# Patient Record
Sex: MALE | Race: WHITE | NOT HISPANIC OR LATINO | Employment: FULL TIME | URBAN - METROPOLITAN AREA
[De-identification: names, ages, dates, MRNs, and addresses within clinical notes are randomized per-mention and may not be internally consistent; named-entity substitution may affect disease eponyms.]

---

## 2018-05-25 ENCOUNTER — OFFICE VISIT (OUTPATIENT)
Dept: FAMILY MEDICINE | Facility: CLINIC | Age: 33
End: 2018-05-25
Payer: COMMERCIAL

## 2018-05-25 VITALS
HEIGHT: 67 IN | RESPIRATION RATE: 14 BRPM | HEART RATE: 66 BPM | BODY MASS INDEX: 21.88 KG/M2 | DIASTOLIC BLOOD PRESSURE: 74 MMHG | WEIGHT: 139.4 LBS | SYSTOLIC BLOOD PRESSURE: 116 MMHG | TEMPERATURE: 97.2 F | OXYGEN SATURATION: 98 %

## 2018-05-25 DIAGNOSIS — Z00.00 ROUTINE GENERAL MEDICAL EXAMINATION AT A HEALTH CARE FACILITY: Primary | ICD-10-CM

## 2018-05-25 DIAGNOSIS — Z23 NEED FOR PROPHYLACTIC VACCINATION WITH TETANUS-DIPHTHERIA (TD): ICD-10-CM

## 2018-05-25 DIAGNOSIS — R53.83 FATIGUE, UNSPECIFIED TYPE: ICD-10-CM

## 2018-05-25 DIAGNOSIS — R09.82 POST-NASAL DRIP: ICD-10-CM

## 2018-05-25 DIAGNOSIS — J31.2 SORE THROAT, CHRONIC: ICD-10-CM

## 2018-05-25 LAB
ALBUMIN SERPL-MCNC: 4.3 G/DL (ref 3.4–5)
ALP SERPL-CCNC: 63 U/L (ref 40–150)
ALT SERPL W P-5'-P-CCNC: 20 U/L (ref 0–70)
ANION GAP SERPL CALCULATED.3IONS-SCNC: 6 MMOL/L (ref 3–14)
AST SERPL W P-5'-P-CCNC: 22 U/L (ref 0–45)
BILIRUB SERPL-MCNC: 0.7 MG/DL (ref 0.2–1.3)
BUN SERPL-MCNC: 18 MG/DL (ref 7–30)
CALCIUM SERPL-MCNC: 9.5 MG/DL (ref 8.5–10.1)
CHLORIDE SERPL-SCNC: 106 MMOL/L (ref 94–109)
CHOLEST SERPL-MCNC: 89 MG/DL
CO2 SERPL-SCNC: 28 MMOL/L (ref 20–32)
CREAT SERPL-MCNC: 0.98 MG/DL (ref 0.66–1.25)
GFR SERPL CREATININE-BSD FRML MDRD: 88 ML/MIN/1.7M2
GLUCOSE SERPL-MCNC: 89 MG/DL (ref 70–99)
HDLC SERPL-MCNC: 51 MG/DL
HETEROPH AB SER QL: NEGATIVE
LDLC SERPL CALC-MCNC: 28 MG/DL
NONHDLC SERPL-MCNC: 38 MG/DL
POTASSIUM SERPL-SCNC: 4.4 MMOL/L (ref 3.4–5.3)
PROT SERPL-MCNC: 8.1 G/DL (ref 6.8–8.8)
SODIUM SERPL-SCNC: 140 MMOL/L (ref 133–144)
TRIGL SERPL-MCNC: 50 MG/DL

## 2018-05-25 PROCEDURE — 80053 COMPREHEN METABOLIC PANEL: CPT | Performed by: FAMILY MEDICINE

## 2018-05-25 PROCEDURE — 99395 PREV VISIT EST AGE 18-39: CPT | Mod: 25 | Performed by: FAMILY MEDICINE

## 2018-05-25 PROCEDURE — 36415 COLL VENOUS BLD VENIPUNCTURE: CPT | Performed by: FAMILY MEDICINE

## 2018-05-25 PROCEDURE — 90471 IMMUNIZATION ADMIN: CPT | Performed by: FAMILY MEDICINE

## 2018-05-25 PROCEDURE — 99213 OFFICE O/P EST LOW 20 MIN: CPT | Mod: 25 | Performed by: FAMILY MEDICINE

## 2018-05-25 PROCEDURE — 80061 LIPID PANEL: CPT | Performed by: FAMILY MEDICINE

## 2018-05-25 PROCEDURE — 90715 TDAP VACCINE 7 YRS/> IM: CPT | Performed by: FAMILY MEDICINE

## 2018-05-25 PROCEDURE — 86308 HETEROPHILE ANTIBODY SCREEN: CPT | Performed by: FAMILY MEDICINE

## 2018-05-25 RX ORDER — AZELASTINE 1 MG/ML
1 SPRAY, METERED NASAL 2 TIMES DAILY
Qty: 1 BOTTLE | Refills: 1 | Status: SHIPPED | OUTPATIENT
Start: 2018-05-25 | End: 2019-02-04

## 2018-05-25 ASSESSMENT — ENCOUNTER SYMPTOMS
DIARRHEA: 0
DIZZINESS: 0
SINUS PAIN: 0
LIGHT-HEADEDNESS: 0
DECREASED CONCENTRATION: 0
TROUBLE SWALLOWING: 0
APPETITE CHANGE: 0
HEMATOCHEZIA: 0
DYSPHORIC MOOD: 0
UNEXPECTED WEIGHT CHANGE: 0
HEARTBURN: 0
EYE REDNESS: 0
ARTHRALGIAS: 0
PALPITATIONS: 0
SHORTNESS OF BREATH: 0
ACTIVITY CHANGE: 0
ADENOPATHY: 0
COUGH: 0
SLEEP DISTURBANCE: 0
FATIGUE: 0
SINUS PRESSURE: 0
DIAPHORESIS: 0
DIFFICULTY URINATING: 0
SORE THROAT: 1
ABDOMINAL PAIN: 0
RHINORRHEA: 0
HEADACHES: 0
NERVOUS/ANXIOUS: 0
CONSTIPATION: 0

## 2018-05-25 NOTE — PATIENT INSTRUCTIONS
[facility]    If you have any questions regarding to your visit please contact your care team:       Team Purple:   Clinic Hours Telephone Number   Dr. Maxine Osuna   7am-7pm  Monday - Thursday   7am-5pm  Fridays  [phone]  (Appointment scheduling available 24/7)    Questions about your recent visit?   Team Line:  [phone]   Urgent Care - [place] and [facility] - 11am-9pm Monday-Friday Saturday-Sunday- 9am-5pm   [place] - 5pm-9pm Monday-Friday Saturday-Sunday- 9am-5pm  [phone] - [place]  [phone] - [place]       What options do I have for a visit other than an office visit? We offer electronic visits (e-visits) and telephone visits, when medically appropriate.  Please check with your medical insurance to see if these types of visits are covered, as you will be responsible for any charges that are not paid by your insurance.      You can use [employer] (secure electronic communication) to access to your chart, send your primary care provider a message, or make an appointment. Ask a team member how to get started.     For a price quote for your services, please call our Consumer Price Line at [phone] or our Imaging Cost estimation line at [phone] (for imaging tests).        Preventive Health Recommendations  Male Ages 26 - 39    Yearly exam:             See your health care provider every year in order to  o   Review health changes.   o   Discuss preventive care.    o   Review your medicines if your doctor has prescribed any.    You should be tested each year for STDs (sexually transmitted diseases), if you re at risk.     After age 35, talk to your provider about cholesterol testing. If you are at risk for heart disease, have your cholesterol tested at least every 5 years.     If you are at risk for diabetes, you should have a diabetes test (fasting glucose).  Shots: Get a flu shot each year. Get a tetanus  shot every 10 years.     Nutrition:    Eat at least 5 servings of fruits and vegetables daily.     Eat whole-grain bread, whole-wheat pasta and brown rice instead of white grains and rice.     Talk to your provider about Calcium and Vitamin D.     Lifestyle    Exercise for at least 150 minutes a week (30 minutes a day, 5 days a week). This will help you control your weight and prevent disease.     Limit alcohol to one drink per day.     No smoking.     Wear sunscreen to prevent skin cancer.     See your dentist every six months for an exam and cleaning.

## 2018-05-25 NOTE — MR AVS SNAPSHOT
After Visit Summary   5/25/2018    Linus Durand    MRN: [id number]           Patient Information     Date Of Birth          1985        Visit Information        Provider Department      5/25/2018 9:20 AM Brooks Osuna MD [facility]        Today's Diagnoses     Need for prophylactic vaccination with tetanus-diphtheria (TD)    -  1    Post-nasal drip        Sore throat, chronic        Fatigue, unspecified type        Routine general medical examination at a health care facility          Care Instructions    [facility]    If you have any questions regarding to your visit please contact your care team:       Team Purple:   Clinic Hours Telephone Number   Dr. Maxine Osuna   7am-7pm  Monday - Thursday   7am-5pm  Fridays  [phone]  (Appointment scheduling available 24/7)    Questions about your recent visit?   Team Line:  [phone]   Urgent Care - [place] and [facility] - 11am-9pm Monday-Friday Saturday-Sunday- 9am-5pm   [place] - 5pm-9pm Monday-Friday Saturday-Sunday- 9am-5pm  [phone] - [place]  [phone] - [place]       What options do I have for a visit other than an office visit? We offer electronic visits (e-visits) and telephone visits, when medically appropriate.  Please check with your medical insurance to see if these types of visits are covered, as you will be responsible for any charges that are not paid by your insurance.      You can use [employer] (secure electronic communication) to access to your chart, send your primary care provider a message, or make an appointment. Ask a team member how to get started.     For a price quote for your services, please call our Consumer Price Line at [phone] or our Imaging Cost estimation line at [phone] (for imaging tests).        Preventive Health Recommendations  Male Ages 26 - 39    Yearly exam:              See your health care provider every year in order to  o   Review health changes.   o   Discuss preventive care.    o   Review your medicines if your doctor has prescribed any.    You should be tested each year for STDs (sexually transmitted diseases), if you re at risk.     After age 35, talk to your provider about cholesterol testing. If you are at risk for heart disease, have your cholesterol tested at least every 5 years.     If you are at risk for diabetes, you should have a diabetes test (fasting glucose).  Shots: Get a flu shot each year. Get a tetanus shot every 10 years.     Nutrition:    Eat at least 5 servings of fruits and vegetables daily.     Eat whole-grain bread, whole-wheat pasta and brown rice instead of white grains and rice.     Talk to your provider about Calcium and Vitamin D.     Lifestyle    Exercise for at least 150 minutes a week (30 minutes a day, 5 days a week). This will help you control your weight and prevent disease.     Limit alcohol to one drink per day.     No smoking.     Wear sunscreen to prevent skin cancer.     See your dentist every six months for an exam and cleaning.             Follow-ups after your visit        Follow-up notes from your care team     Return if symptoms worsen or fail to improve.      Who to contact     If you have questions or need follow up information about today's clinic visit or your schedule please contact [facility] directly at [phone].  Normal or non-critical lab and imaging results will be communicated to you by MyChart, letter or phone within 4 business days after the clinic has received the results. If you do not hear from us within 7 days, please contact the clinic through MyChart or phone. If you have a critical or abnormal lab result, we will notify you by phone as soon as possible.  Submit refill requests through [employer] or call your pharmacy and they will forward the refill request to us. Please allow 3 business days for  "your refill to be completed.          Additional Information About Your Visit        [employer] Information     [employer] lets you send messages to your doctor, view your test results, renew your prescriptions, schedule appointments and more. To sign up, go to [email or web address] . Click on \"Log in\" on the left side of the screen, which will take you to the Welcome page. Then click on \"Sign up Now\" on the right side of the page.     You will be asked to enter the access code listed below, as well as some personal information. Please follow the directions to create your username and password.     Your access code is: [email or web address]  Expires: 2018  9:13 AM     Your access code will  in 90 days. If you need help or a new code, please call your [place] clinic or [phone].        Care EveryWhere ID     This is your Care EveryWhere ID. This could be used by other organizations to access your [place] medical records  [id number]        Your Vitals Were     Pulse Temperature Respirations Height Pulse Oximetry BMI (Body Mass Index)    66 97.2  F (36.2  C) (Oral) 14 5' 6.93\" (1.7 m) 98% 21.88 kg/m2       Blood Pressure from Last 3 Encounters:   18 116/74   16 136/85    Weight from Last 3 Encounters:   18 139 lb 6.4 oz (63.2 kg)   16 141 lb 12.8 oz (64.3 kg)              We Performed the Following     Comprehensive metabolic panel     Lipid panel reflex to direct LDL Fasting     Mononucleosis screen     TDAP VACCINE (ADACEL)          Today's Medication Changes          These changes are accurate as of 18 10:10 AM.  If you have any questions, ask your nurse or doctor.               Start taking these medicines.        Dose/Directions    azelastine 0.1 % spray   Commonly known as:  ASTELIN   Used for:  Post-nasal drip, Sore throat, chronic   Started by:  Brooks Egan MD        Dose:  1 spray   Spray 1 spray into both nostrils 2 times daily   Quantity:  1 Bottle "   Refills:  1            Where to get your medicines      These medications were sent to [facility] [place] - [address]  [address], [place] [address]     Phone:  [phone]     azelastine 0.1 % spray                Primary Care Provider Fax #    Provider Not In System [phone]                Equal Access to Services     [facility]: Hadii aad ku hadasho Soomaali, waaxda luqadaha, qaybta kaalmada adeegyada, waxay idiin hayaan adeeg kharash la'aan ah. So [facility] [phone].    ATENCIÓN: Si habla español, tiene a denise disposición servicios gratuitos de asistencia lingüística. Kateyame al [phone].    We comply with applicable federal civil rights laws and [place] laws. We do not discriminate on the basis of race, color, national origin, age, disability, sex, sexual orientation, or gender identity.            Thank you!     Thank you for choosing [facility]  for your care. Our goal is always to provide you with excellent care. Hearing back from our patients is one way we can continue to improve our services. Please take a few minutes to complete the written survey that you may receive in the mail after your visit with us. Thank you!             Your Updated Medication List - Protect others around you: Learn how to safely use, store and throw away your medicines at www.disposemymeds.org.          This list is accurate as of 5/25/18 10:10 AM.  Always use your most recent med list.                   Brand Name Dispense Instructions for use Diagnosis    azelastine 0.1 % spray    ASTELIN    1 Bottle    Spray 1 spray into both nostrils 2 times daily    Post-nasal drip, Sore throat, chronic       naproxen 500 MG tablet    NAPROSYN    60 tablet    Take 1 tablet (500 mg) by mouth 2 times daily as needed for moderate pain    Impingement syndrome of right shoulder

## 2018-05-25 NOTE — PROGRESS NOTES
SUBJECTIVE:   CC: Linus Durand is an 33 year old male who presents for preventative health visit.     Physical   Annual:     Getting at least 3 servings of Calcium per day::  Yes    Bi-annual eye exam::  Yes    Dental care twice a year::  Yes    Sleep apnea or symptoms of sleep apnea::  None    Diet::  Gluten-free/reduced    Frequency of exercise::  2-3 days/week    Duration of exercise::  30-45 minutes    Taking medications regularly::  Yes    Medication side effects::  Not applicable    Additional concerns today::  No            Concerns: Sore throat for 1 month  Started 1 month ago without any other symptoms, had uri 1 week ago, had a few nights had night sweats x 3.  No sick contacts.  Constant throughout the day  Seasonal allergies  Cough drops once and a while, tea  Some fatigue with sore throat, sleeping more and not as much energy, sleep quality is the same  Working often times 70 hours per week as a  from home and has increased stress as of late.  No tobacco  Some alcohol  Exercise - some  Healthy BMI  Breakfast - cereal - cheerios  Lunch - yogurt string cheees v8  Dinner - all over the board  Snack - nuts  Water coffee tea during the day  Medications - none  Allergies - sometimes takes claritin, not often.  Ceclor  Surgery - broken collar bone, EGD, Colonoscopy 2012, 2001 lymph node removed from neck    Today's PHQ-2 Score:   PHQ-2 ( 1999 Pfizer) 5/25/2018   Q1: Little interest or pleasure in doing things 0   Q2: Feeling down, depressed or hopeless 0   PHQ-2 Score 0   Q1: Little interest or pleasure in doing things Not at all   Q2: Feeling down, depressed or hopeless Not at all   PHQ-2 Score 0       Abuse: Current or Past(Physical, Sexual or Emotional)- No  Do you feel safe in your environment - Yes    Social History   Substance Use Topics     Smoking status: Never Smoker     Smokeless tobacco: Never Used     Alcohol use Yes      Comment: once or twice a month     Alcohol Use 5/25/2018  "  If you drink alcohol do you typically have greater than 3 drinks per day OR greater than 7 drinks per week? No       Last PSA: No results found for: PSA    Reviewed orders with patient. Reviewed health maintenance and updated orders accordingly - Yes  BP Readings from Last 3 Encounters:   05/25/18 116/74   09/16/16 136/85    Wt Readings from Last 3 Encounters:   05/25/18 139 lb 6.4 oz (63.2 kg)   09/16/16 141 lb 12.8 oz (64.3 kg)        Reviewed and updated as needed this visit by clinical staff  Tobacco  Allergies  Meds  Problems  Soc Hx        Reviewed and updated as needed this visit by Provider  Allergies  Meds  Problems          Review of Systems   Constitutional: Negative for activity change, appetite change, diaphoresis, fatigue and unexpected weight change.   HENT: Positive for sore throat. Negative for congestion, postnasal drip, rhinorrhea, sinus pain, sinus pressure and trouble swallowing.    Eyes: Negative for redness and visual disturbance.   Respiratory: Negative for cough and shortness of breath.    Cardiovascular: Negative for palpitations and peripheral edema.   Gastrointestinal: Negative for abdominal pain, constipation, diarrhea, heartburn and hematochezia.   Endocrine: Negative for cold intolerance and heat intolerance.   Genitourinary: Negative for difficulty urinating and testicular pain.   Musculoskeletal: Negative for arthralgias.   Skin: Negative for rash.   Allergic/Immunologic: Negative for environmental allergies.   Neurological: Negative for dizziness, light-headedness and headaches.   Hematological: Negative for adenopathy.   Psychiatric/Behavioral: Negative for decreased concentration, dysphoric mood, mood changes and sleep disturbance. The patient is not nervous/anxious.        OBJECTIVE:   /74  Pulse 66  Temp 97.2  F (36.2  C) (Oral)  Resp 14  Ht 5' 6.93\" (1.7 m)  Wt 139 lb 6.4 oz (63.2 kg)  SpO2 98%  BMI 21.88 kg/m2    Physical Exam   Constitutional: He is " oriented to person, place, and time. He appears well-developed and well-nourished. No distress.   HENT:   Head: Normocephalic and atraumatic.   Right Ear: External ear normal.   Left Ear: External ear normal.   Pharyngeal erythema, cobblestoning, mucus posterior to right tonsil   Eyes: Conjunctivae and EOM are normal.   Neck: Normal range of motion. Neck supple.   Cardiovascular: Normal rate, regular rhythm, normal heart sounds and intact distal pulses.    No murmur heard.  Pulmonary/Chest: Effort normal and breath sounds normal. No respiratory distress. He has no rales.   Musculoskeletal: Normal range of motion. He exhibits no edema.   Neurological: He is alert and oriented to person, place, and time. He has normal reflexes.   Skin: Skin is warm and dry. No rash noted. He is not diaphoretic. No erythema.   Psychiatric: He has a normal mood and affect. His behavior is normal. Judgment and thought content normal.     ASSESSMENT/PLAN:   1. Routine general medical examination at a health care facility  Health maintenance updated  - Lipid panel reflex to direct LDL Fasting  - Comprehensive metabolic panel    2. Need for prophylactic vaccination with tetanus-diphtheria (TD)  - TDAP VACCINE (ADACEL)    3. Post-nasal drip  - azelastine (ASTELIN) 0.1 % spray; Spray 1 spray into both nostrils 2 times daily  Dispense: 1 Bottle; Refill: 1    4. Sore throat, chronic  Likely viral in nature, vs postnasal drip due to seasonal allergies  - Mononucleosis screen  - azelastine (ASTELIN) 0.1 % spray; Spray 1 spray into both nostrils 2 times daily  Dispense: 1 Bottle; Refill: 1    5. Fatigue, unspecified type  Likely due to increased work hours and work stress, will check mono as well  - Mononucleosis screen    6. BMI 21.0-21.9, adult      COUNSELING:   Reviewed preventive health counseling, as reflected in patient instructions       Regular exercise       Healthy diet/nutrition         reports that he has never smoked. He has never  "used smokeless tobacco.    Estimated body mass index is 21.88 kg/(m^2) as calculated from the following:    Height as of this encounter: 5' 6.93\" (1.7 m).    Weight as of this encounter: 139 lb 6.4 oz (63.2 kg).       Counseling Resources:  ATP IV Guidelines  Pooled Cohorts Equation Calculator  FRAX Risk Assessment  ICSI Preventive Guidelines  Dietary Guidelines for Americans, 2010  USDA's MyPlate  ASA Prophylaxis  Lung CA Screening    Brooks Osuna MD  [facility]  "

## 2018-05-25 NOTE — LETTER
[address] [place] [address]    May 29, 2018    Linus Durand  [address] [place] [id number]          Dear Linus,    You have a healthy cholesterol panel.  Your electrolytes, liver function, and kidney function are all normal.  Your mono screen came back negative.  Please let me know if you have any questions    Enclosed is a copy of your results.     Results for orders placed or performed in visit on 05/25/18   Lipid panel reflex to direct LDL Fasting   Result Value Ref Range    Cholesterol 89 <200 mg/dL    Triglycerides 50 <150 mg/dL    HDL Cholesterol 51 >39 mg/dL    LDL Cholesterol Calculated 28 <100 mg/dL    Non HDL Cholesterol 38 <130 mg/dL   Comprehensive metabolic panel   Result Value Ref Range    Sodium 140 133 - 144 mmol/L    Potassium 4.4 3.4 - 5.3 mmol/L    Chloride 106 94 - 109 mmol/L    Carbon Dioxide 28 20 - 32 mmol/L    Anion Gap 6 3 - 14 mmol/L    Glucose 89 70 - 99 mg/dL    Urea Nitrogen 18 7 - 30 mg/dL    Creatinine 0.98 0.66 - 1.25 mg/dL    GFR Estimate 88 >60 mL/min/1.7m2    GFR Estimate If Black >90 >60 mL/min/1.7m2    Calcium 9.5 8.5 - 10.1 mg/dL    Bilirubin Total 0.7 0.2 - 1.3 mg/dL    Albumin 4.3 3.4 - 5.0 g/dL    Protein Total 8.1 6.8 - 8.8 g/dL    Alkaline Phosphatase 63 40 - 150 U/L    ALT 20 0 - 70 U/L    AST 22 0 - 45 U/L   Mononucleosis screen   Result Value Ref Range    Mononucleosis Screen Negative NEG^Negative       If you have any questions or concerns, please call myself or my nurse at [phone].      Sincerely,        Brooks Egan MD/ricky

## 2018-05-25 NOTE — NURSING NOTE
Screening Questionnaire for Adult Immunization    Are you sick today?   No   Do you have allergies to medications, food, a vaccine component or latex?   No   Have you ever had a serious reaction after receiving a vaccination?   No   Do you have a long-term health problem with heart disease, lung disease, asthma, kidney disease, metabolic disease (e.g. diabetes), anemia, or other blood disorder?   No   Do you have cancer, leukemia, HIV/AIDS, or any other immune system problem?   No   In the past 3 months, have you taken medications that affect  your immune system, such as prednisone, other steroids, or anticancer drugs; drugs for the treatment of rheumatoid arthritis, Crohn s disease, or psoriasis; or have you had radiation treatments?   No   Have you had a seizure, or a brain or other nervous system problem?   No   During the past year, have you received a transfusion of blood or blood     products, or been given immune (gamma) globulin or antiviral drug?   No   For women: Are you pregnant or is there a chance you could become        pregnant during the next month?   No   Have you received any vaccinations in the past 4 weeks?   No     Immunization questionnaire answers were all negative.        Per orders of Dr. Egan, injection of TDAP given by Nicole Vo. Patient instructed to remain in clinic for 15 minutes afterwards, and to report any adverse reaction to me immediately.       Screening performed by Nicole Vo on 5/25/2018 at 9:43 AM.

## 2019-02-04 ENCOUNTER — OFFICE VISIT (OUTPATIENT)
Dept: FAMILY MEDICINE | Facility: CLINIC | Age: 34
End: 2019-02-04
Payer: COMMERCIAL

## 2019-02-04 VITALS
SYSTOLIC BLOOD PRESSURE: 106 MMHG | BODY MASS INDEX: 20.94 KG/M2 | DIASTOLIC BLOOD PRESSURE: 72 MMHG | TEMPERATURE: 96.9 F | HEART RATE: 76 BPM | HEIGHT: 67 IN | OXYGEN SATURATION: 100 % | WEIGHT: 133.4 LBS | RESPIRATION RATE: 18 BRPM

## 2019-02-04 DIAGNOSIS — Z00.00 ROUTINE GENERAL MEDICAL EXAMINATION AT A HEALTH CARE FACILITY: Primary | ICD-10-CM

## 2019-02-04 PROCEDURE — 99395 PREV VISIT EST AGE 18-39: CPT | Performed by: FAMILY MEDICINE

## 2019-02-04 ASSESSMENT — ENCOUNTER SYMPTOMS
HEMATURIA: 0
HEMATOCHEZIA: 0
HEARTBURN: 0
WEAKNESS: 0
PALPITATIONS: 0
NERVOUS/ANXIOUS: 0
HEADACHES: 0
DIZZINESS: 0
CONSTIPATION: 0
ABDOMINAL PAIN: 0
SORE THROAT: 0
COUGH: 0
DYSURIA: 0
DIARRHEA: 0
PARESTHESIAS: 0
EYE PAIN: 0
NAUSEA: 0
MYALGIAS: 0
SHORTNESS OF BREATH: 0
CHILLS: 0
FEVER: 0
FREQUENCY: 0
JOINT SWELLING: 0
ARTHRALGIAS: 0

## 2019-02-04 ASSESSMENT — MIFFLIN-ST. JEOR: SCORE: 1506.34

## 2019-02-04 NOTE — PROGRESS NOTES
SUBJECTIVE:   CC: Linus Durand is an 33 year old male who presents for preventative health visit.     Physical   Annual:     Getting at least 3 servings of Calcium per day:  Yes    Bi-annual eye exam:  Yes    Dental care twice a year:  Yes    Sleep apnea or symptoms of sleep apnea:  None    Diet:  Gluten-free/reduced    Frequency of exercise:  4-5 days/week    Duration of exercise:  30-45 minutes    Taking medications regularly:  Yes    Medication side effects:  Not applicable    Additional concerns today:  No    PHQ-2 Total Score: 0    Linus presents for yearly physical    Concerns:  None  Tobacco use none  Alcohol use some  Diet:  healthy  Exercise - some    Today's PHQ-2 Score:   PHQ-2 ( 1999 [employer]) 2/4/2019   Q1: Little interest or pleasure in doing things 0   Q2: Feeling down, depressed or hopeless 0   PHQ-2 Score 0   Q1: Little interest or pleasure in doing things Not at all   Q2: Feeling down, depressed or hopeless Not at all   PHQ-2 Score 0       Abuse: Current or Past(Physical, Sexual or Emotional)- No  Do you feel safe in your environment? Yes    Social History     Tobacco Use     Smoking status: Never Smoker     Smokeless tobacco: Never Used   Substance Use Topics     Alcohol use: Yes     Comment: once or twice a month     Alcohol Use 2/4/2019   If you drink alcohol do you typically have greater than 3 drinks per day OR greater than 7 drinks per week? No       Last PSA: No results found for: PSA    Reviewed orders with patient. Reviewed health maintenance and updated orders accordingly - Yes  BP Readings from Last 3 Encounters:   02/04/19 106/72   05/25/18 116/74   09/16/16 136/85    Wt Readings from Last 3 Encounters:   02/04/19 60.5 kg (133 lb 6.4 oz)   05/25/18 63.2 kg (139 lb 6.4 oz)   09/16/16 64.3 kg (141 lb 12.8 oz)        Reviewed and updated as needed this visit by clinical staff  Tobacco  Allergies  Meds  Problems  Med Hx  Surg Hx  Fam Hx         Reviewed and updated as needed this  "visit by Provider  Tobacco  Allergies  Meds  Problems  Med Hx  Surg Hx  Fam Hx          Review of Systems   Constitutional: Negative for chills and fever.   HENT: Negative for congestion, ear pain, hearing loss and sore throat.    Eyes: Negative for pain and visual disturbance.   Respiratory: Negative for cough and shortness of breath.    Cardiovascular: Negative for chest pain, palpitations and peripheral edema.   Gastrointestinal: Negative for abdominal pain, constipation, diarrhea, heartburn, hematochezia and nausea.   Genitourinary: Negative for discharge, dysuria, frequency, genital sores, hematuria, impotence and urgency.   Musculoskeletal: Negative for arthralgias, joint swelling and myalgias.   Skin: Negative for rash.   Neurological: Negative for dizziness, weakness, headaches and paresthesias.   Psychiatric/Behavioral: Negative for mood changes. The patient is not nervous/anxious.      OBJECTIVE:   /72   Pulse 76   Temp 96.9  F (36.1  C) (Oral)   Resp 18   Ht 1.698 m (5' 6.85\")   Wt 60.5 kg (133 lb 6.4 oz)   SpO2 100%   BMI 20.99 kg/m      Physical Exam  GENERAL: healthy, alert and no distress  EYES: Eyes grossly normal to inspection, PERRL and conjunctivae and sclerae normal  HENT: ear canals and TM's normal, nose and mouth without ulcers or lesions  NECK: no adenopathy, no asymmetry, masses, or scars and thyroid normal to palpation  RESP: lungs clear to auscultation - no rales, rhonchi or wheezes  CV: regular rate and rhythm, normal S1 S2, no S3 or S4, no murmur, click or rub, no peripheral edema and peripheral pulses strong  ABDOMEN: soft, nontender, no hepatosplenomegaly, no masses and bowel sounds normal  MS: no gross musculoskeletal defects noted, no edema  SKIN: no suspicious lesions or rashes  NEURO: Normal strength and tone, mentation intact and speech normal  PSYCH: mentation appears normal, affect normal/bright    ASSESSMENT/PLAN:   1. Routine general medical examination at a " "health care facility  Health maintenance updated.     2. BMI 20.0-20.9, adult        COUNSELING:   Reviewed preventive health counseling, as reflected in patient instructions       Regular exercise       Healthy diet/nutrition    BP Readings from Last 1 Encounters:   02/04/19 106/72     Estimated body mass index is 20.99 kg/m  as calculated from the following:    Height as of this encounter: 1.698 m (5' 6.85\").    Weight as of this encounter: 60.5 kg (133 lb 6.4 oz).           reports that  has never smoked. he has never used smokeless tobacco.      Counseling Resources:  ATP IV Guidelines  Pooled Cohorts Equation Calculator  FRAX Risk Assessment  ICSI Preventive Guidelines  Dietary Guidelines for Americans, 2010  USDA's MyPlate  ASA Prophylaxis  Lung CA Screening    Brooks Osuna MD  [facility]  "

## 2019-10-22 NOTE — PROGRESS NOTES
"Subjective     Linus Durand is a 34 year old male who presents to clinic today for the following health issues:    HPI   Musculoskeletal problem/pain      Duration: 2 months    Description  Location: Left hip    Intensity:  1/10 currently and 8-9/10 at its worst    Accompanying signs and symptoms: tingling, burning sensation, pinching    History  Previous similar problem: no   Previous evaluation:  none    Precipitating or alleviating factors:  Trauma or overuse: no   Aggravating factors include: sitting and will wake up due to pain in the middle of the night    Therapies tried and outcome: rest/inactivity and ice    Left hip pain  X 2 months or so  Intermittent  Less painful with activity  Painful when sedentary and when laying down, tingles/burns if lays on side  Wakes him up from a deep sleep sometimes, extremely painful/sharp pain  Also happens when he rolls to the right as well    BP Readings from Last 3 Encounters:   10/24/19 118/70   02/04/19 106/72   05/25/18 116/74    Wt Readings from Last 3 Encounters:   10/24/19 62.4 kg (137 lb 8 oz)   02/04/19 60.5 kg (133 lb 6.4 oz)   05/25/18 63.2 kg (139 lb 6.4 oz)                      Reviewed and updated as needed this visit by Provider  Tobacco  Allergies  Meds  Problems  Med Hx  Surg Hx  Fam Hx         Review of Systems   ROS COMP: Constitutional, HEENT, cardiovascular, pulmonary, gi and gu systems are negative, except as otherwise noted.      Objective    /70   Pulse 78   Temp 97.3  F (36.3  C) (Oral)   Resp 14   Ht 1.698 m (5' 6.85\")   Wt 62.4 kg (137 lb 8 oz)   SpO2 100%   BMI 21.63 kg/m    Body mass index is 21.63 kg/m .  Physical Exam   GENERAL: healthy, alert and no distress  EYES: Eyes grossly normal to inspection, PERRL and conjunctivae and sclerae normal  HENT: ear canals and TM's normal, nose and mouth without ulcers or lesions  NECK: no adenopathy, no asymmetry, masses, or scars and thyroid normal to palpation  RESP: lungs clear to " auscultation - no rales, rhonchi or wheezes  CV: regular rate and rhythm, normal S1 S2, no S3 or S4, no murmur, click or rub, no peripheral edema and peripheral pulses strong  ABDOMEN: soft, nontender, no hepatosplenomegaly, no masses and bowel sounds normal  MS: no gross musculoskeletal defects noted, no edema  SKIN: no suspicious lesions or rashes  NEURO: Normal strength and tone, mentation intact and speech normal  PSYCH: mentation appears normal, affect normal/bright          Assessment & Plan       ICD-10-CM    1. Hip pain, left M25.552 XR Pelvis and Hip Left 1 View     diclofenac (VOLTAREN) 1 % topical gel   2. It band syndrome, left M76.32      Likely IT band syndrome given exam, will order baseline xray, stretches provided for patient, update in 2 weeks       Follow-up in 2 weeks  Brooks Osuna MD  [facility]

## 2019-10-24 ENCOUNTER — OFFICE VISIT (OUTPATIENT)
Dept: FAMILY MEDICINE | Facility: CLINIC | Age: 34
End: 2019-10-24
Payer: COMMERCIAL

## 2019-10-24 ENCOUNTER — ANCILLARY PROCEDURE (OUTPATIENT)
Dept: GENERAL RADIOLOGY | Facility: CLINIC | Age: 34
End: 2019-10-24
Attending: FAMILY MEDICINE
Payer: COMMERCIAL

## 2019-10-24 VITALS
OXYGEN SATURATION: 100 % | HEART RATE: 78 BPM | BODY MASS INDEX: 21.58 KG/M2 | SYSTOLIC BLOOD PRESSURE: 118 MMHG | RESPIRATION RATE: 14 BRPM | HEIGHT: 67 IN | WEIGHT: 137.5 LBS | DIASTOLIC BLOOD PRESSURE: 70 MMHG | TEMPERATURE: 97.3 F

## 2019-10-24 DIAGNOSIS — M25.552 HIP PAIN, LEFT: Primary | ICD-10-CM

## 2019-10-24 DIAGNOSIS — M25.552 HIP PAIN, LEFT: ICD-10-CM

## 2019-10-24 DIAGNOSIS — M76.32 IT BAND SYNDROME, LEFT: ICD-10-CM

## 2019-10-24 PROCEDURE — 99214 OFFICE O/P EST MOD 30 MIN: CPT | Performed by: FAMILY MEDICINE

## 2019-10-24 PROCEDURE — 73502 X-RAY EXAM HIP UNI 2-3 VIEWS: CPT

## 2019-10-24 ASSESSMENT — MIFFLIN-ST. JEOR: SCORE: 1519.94

## 2019-10-24 NOTE — PATIENT INSTRUCTIONS
Patient Education     Treatment for Iliotibial Band Syndrome  Iliotibial band syndrome, or IT band syndrome, is a condition that causes pain on the outside of the knee. It most often occurs in athletes, especially long-distance runners. It can happen if you cycle, ski, row, or play soccer. It can also occur in people who are starting to exercise.  Types of treatment  Treatment may include:    Avoiding any activity that makes your knee pain worse for a while (like running), and returning to this activity slowly over time    Icing the outside of your knee when it causes you pain    Taking over-the-counter pain medicines    Having corticosteroid injections, to reduce inflammation    Making changes to your activity, like lowering your bicycle seat for cycling or improving your running form    Practicing special exercises to stretch and strengthen the muscles around your hip and your knee  You may find it helpful to work with a physical therapist.  These treatments help most people with IT band syndrome. Your doctor may advise surgery if you still have severe symptoms after 6 months or more of other treatment. Your doctor will talk with you about the types of surgery.  Preventing IT band syndrome  You may be able to prevent IT band syndrome if you:    Run on even surfaces    Replace your running shoes often    Ease up on your training    On a track, make sure you run in both directions    Have an expert check your stance for running and other sporting activities    Stretch your outer thigh and hamstrings often  If you are new to exercise, start slowly. Increase your activity over time.  Talk with your doctor or  for more advice.  When to call the healthcare provider  Call your healthcare provider right away if you have any of these:    Symptoms that get worse, or don t get better with treatment    New symptoms  Date Last Reviewed: 5/1/2018 2000-2018 [employer]. [address] [facility] [address] [facility]  [place] [address]. All rights reserved. This information is not intended as a substitute for professional medical care. Always follow your healthcare professional's instructions.           Patient Education     Understanding Iliotibial Band Syndrome  Iliotibial band syndrome, or IT band syndrome, is a condition that causes pain on the outside of the knee. It most often occurs in athletes, especially long-distance runners. It can also happen if you cycle, ski, row, or play soccer. It can also occur in people who are just starting to exercise.  What is the IT band?  The iliotibial (IT) band is a strong, thick band of tissue that runs down the outside of your thigh. It goes all the way from your hipbones to the top of your shinbone (tibia), just below your knee joint. The bones of your knee joint include your tibia, your thighbone (femur), and your kneecap (patella).  When you bend and straighten your leg, the IT band moves over the outer lower edge of your femur. Over time, bending and straightening your leg can cause the IT band to irritate nearby tissues and cause pain.  What causes IT band syndrome?  Researchers are still learning the exact cause of IT band syndrome. The pain may be caused by the IT band rubbing over the lower outer edge of the femur. This may cause inflammation in the bone, tendons, and small fluid-filled sacs (bursa) in the area. The IT band may also compress the tissue under it and cause pain.  If you are a runner, you might be more likely to develop IT band syndrome if:    You run on uneven or downhill terrain    You run on worn-out shoes    You run many miles per day    Your legs slope a little inward from your knee to your ankle (bowlegs)  What are the symptoms of IT band syndrome?  IT band syndrome causes pain on the outside of the knee or side of the thigh. It might affect one or both of your knees. The pain is an aching, burning feeling that can spread up the thigh to the hip. You may feel this  pain only when you exercise, such as while running. The pain may be worst right after you step on your foot. It may only happen near the end of your exercise. As the condition gets worse, pain may start earlier and continue after you have stopped exercising. Going up and down the stairs may make the pain worse.  How is IT band syndrome diagnosed?  Your doctor will ask about your health history and your symptoms. He or she will give you a physical exam. This will include an exam of your knee. The range of motion and strength of your knee will be tested. The doctor will look for areas of pain around your knee, thigh, and hip. The symptoms of IT band syndrome can be like those of osteoarthritis or a meniscal tear. Your doctor will need to make sure IT band syndrome is the cause of your symptoms. If the diagnosis is not clear, you may need imaging tests. These can include an X-ray or MRI scan.  Date Last Reviewed: 4/1/2017 2000-2018 [employer]. [address] [place] [address], [place] [address]. All rights reserved. This information is not intended as a substitute for professional medical care. Always follow your healthcare professional's instructions.

## 2020-11-16 ENCOUNTER — HEALTH MAINTENANCE LETTER (OUTPATIENT)
Age: 35
End: 2020-11-16

## 2021-06-29 ENCOUNTER — E-VISIT (OUTPATIENT)
Dept: URGENT CARE | Facility: URGENT CARE | Age: 36
End: 2021-06-29
Payer: COMMERCIAL

## 2021-06-29 DIAGNOSIS — J01.90 ACUTE SINUSITIS WITH SYMPTOMS > 10 DAYS: Primary | ICD-10-CM

## 2021-06-29 PROCEDURE — 99421 OL DIG E/M SVC 5-10 MIN: CPT | Performed by: PHYSICIAN ASSISTANT

## 2021-06-29 RX ORDER — DOXYCYCLINE HYCLATE 100 MG
100 TABLET ORAL 2 TIMES DAILY
Qty: 14 TABLET | Refills: 0 | Status: SHIPPED | OUTPATIENT
Start: 2021-06-29 | End: 2021-07-06

## 2021-06-29 RX ORDER — FLUTICASONE PROPIONATE 50 MCG
1 SPRAY, SUSPENSION (ML) NASAL DAILY
Qty: 9.9 ML | Refills: 0 | Status: SHIPPED | OUTPATIENT
Start: 2021-06-29 | End: 2022-04-04

## 2021-06-29 NOTE — PATIENT INSTRUCTIONS
Dear Linus Durand    After reviewing your responses, I've been able to diagnose you with?a sinus infection caused by bacteria.?     Based on your responses and diagnosis, I have prescribed Doxycycline and Flonase nasal spray to treat your symptoms. I have sent this to your pharmacy.? I have also put an order in for you to be tested for Covid. Please call [phone] to schedule your Covid test.    It is also important to stay well hydrated, get lots of rest and take over-the-counter decongestants,?tylenol?or ibuprofen if you?are able to?take those medications per your primary care provider to help relieve discomfort.?     It is important that you take?all of?your prescribed medication even if your symptoms are improving after a few doses.? Taking?all of?your medicine helps prevent the symptoms from returning.?     If your symptoms worsen, you develop severe headache, vomiting, high fever (>102), or are not improving in 7 days, please contact your primary care provider for an appointment or visit any of our convenient Walk-in Care or Urgent Care Centers to be seen which can be found on our website?here.?     Thanks again for choosing?us?as your health care partner,?   ?  Aniya Malcolm PA-C?     Sinusitis (Antibiotic Treatment)    The sinuses are air-filled spaces within the bones of the face. They connect to the inside of the nose. Sinusitis is an inflammation of the tissue that lines the sinuses. Sinusitis can occur during a cold. It can also happen due to allergies to pollens and other particles in the air. Sinusitis can cause symptoms of sinus congestion and a feeling of fullness. A sinus infection causes fever, headache, and facial pain. There is often green or yellow fluid draining from the nose or into the back of the throat (post-nasal drip). You have been given antibiotics to treat this condition.   Home care    Take the full course of antibiotics as instructed. Don't stop taking them, even when you  feel better.    Drink plenty of water, hot tea, and other liquids as directed by the healthcare provider. This may help thin nasal mucus. It also may help your sinuses drain fluids.    Heat may help soothe painful areas of your face. Use a towel soaked in hot water. Or,  the shower and direct the warm spray onto your face. Using a vaporizer along with a menthol rub at night may also help soothe symptoms.     An expectorant with guaifenesin may help thin nasal mucus and help your sinuses drain fluids. Talk with your provider or pharmacists before taking an over-the-counter (OTC) medicine if you have any questions about it or its side effects..    You can use an OTC decongestant, unless a similar medicine was prescribed to you. Nasal sprays work the fastest. Use one that contains phenylephrine or oxymetazoline. First blow your nose gently. Then use the spray. Don't use these medicines more often than directed on the label. If you do, your symptoms may get worse. You may also take pills that contain pseudoephedrine. Don t use products that combine multiple medicines. This is because side effects may be increased. Read labels. You can also ask the pharmacist for help. (People with high blood pressure should not use decongestants. They can raise blood pressure.) Talk with your provider or pharmacist if you have any questions about the medicine..    OTC antihistamines may help if allergies contributed to your sinusitis. Talk with your provider or pharmacist if you have any questions about the medicine..    Don't use nasal rinses or irrigation during an acute sinus infection, unless your healthcare provider tells you to. Rinsing may spread the infection to other areas in your sinuses.    Use acetaminophen or ibuprofen to control pain, unless another pain medicine was prescribed to you. If you have chronic liver or kidney disease or ever had a stomach ulcer, talk with your healthcare provider before using these  medicines. Never give aspirin to anyone under age 18 who is ill with a fever. It may cause severe liver damage.    Don't smoke. This can make symptoms worse.    Follow-up care  Follow up with your healthcare provider, or as advised.   When to seek medical advice  Call your healthcare provider if any of these occur:     Facial pain or headache that gets worse    Stiff neck    Unusual drowsiness or confusion    Swelling of your forehead or eyelids    Symptoms don't go away in 10 days    Vision problems, such as blurred or double vision    Fever of 100.4 F (38 C) or higher, or as directed by your healthcare provider  Call 911  Call 911 if any of these occur:     Seizure    Trouble breathing    Feeling dizzy or faint    Fingernails, skin or lips look blue, purple , or gray  Prevention  Here are steps you can take to help prevent an infection:     Keep good hand washing habits.    Don t have close contact with people who have sore throats, colds, or other upper respiratory infections.    Don t smoke, and stay away from secondhand smoke.    Stay up to date with of your vaccines.  [employer] last reviewed this educational content on 12/1/2019 2000-2021 [employer]. All rights reserved. This information is not intended as a substitute for professional medical care. Always follow your healthcare professional's instructions.

## 2021-06-30 DIAGNOSIS — J01.90 ACUTE SINUSITIS WITH SYMPTOMS > 10 DAYS: ICD-10-CM

## 2021-06-30 LAB
LABORATORY COMMENT REPORT: NORMAL
SARS-COV-2 RNA RESP QL NAA+PROBE: NEGATIVE
SARS-COV-2 RNA RESP QL NAA+PROBE: NORMAL
SPECIMEN SOURCE: NORMAL
SPECIMEN SOURCE: NORMAL

## 2021-06-30 PROCEDURE — U0005 INFEC AGEN DETEC AMPLI PROBE: HCPCS | Performed by: PHYSICIAN ASSISTANT

## 2021-06-30 PROCEDURE — U0003 INFECTIOUS AGENT DETECTION BY NUCLEIC ACID (DNA OR RNA); SEVERE ACUTE RESPIRATORY SYNDROME CORONAVIRUS 2 (SARS-COV-2) (CORONAVIRUS DISEASE [COVID-19]), AMPLIFIED PROBE TECHNIQUE, MAKING USE OF HIGH THROUGHPUT TECHNOLOGIES AS DESCRIBED BY CMS-2020-01-R: HCPCS | Performed by: PHYSICIAN ASSISTANT

## 2021-09-18 ENCOUNTER — HEALTH MAINTENANCE LETTER (OUTPATIENT)
Age: 36
End: 2021-09-18

## 2022-01-03 ENCOUNTER — TELEPHONE (OUTPATIENT)
Dept: FAMILY MEDICINE | Facility: CLINIC | Age: 37
End: 2022-01-03

## 2022-01-03 ENCOUNTER — VIRTUAL VISIT (OUTPATIENT)
Dept: FAMILY MEDICINE | Facility: CLINIC | Age: 37
End: 2022-01-03
Payer: COMMERCIAL

## 2022-01-03 DIAGNOSIS — R05.8 POST-VIRAL COUGH SYNDROME: Primary | ICD-10-CM

## 2022-01-03 DIAGNOSIS — R00.2 PALPITATIONS: ICD-10-CM

## 2022-01-03 PROCEDURE — 99214 OFFICE O/P EST MOD 30 MIN: CPT | Mod: 95 | Performed by: FAMILY MEDICINE

## 2022-01-03 RX ORDER — AZELASTINE 1 MG/ML
1 SPRAY, METERED NASAL 2 TIMES DAILY
Qty: 30 ML | Refills: 2 | Status: SHIPPED | OUTPATIENT
Start: 2022-01-03 | End: 2022-05-19

## 2022-01-03 NOTE — PROGRESS NOTES
Linus is a 36 year old who is being evaluated via a billable video visit.      How would you like to obtain your AVS? MyChart  If the video visit is dropped, the invitation should be resent by: Text to cell phone: [phone]  Will anyone else be joining your video visit? No      Video Length 40 mins    Assessment & Plan       ICD-10-CM    1. Post-viral cough syndrome  R05.8 azelastine (ASTELIN) 0.1 % nasal spray     Adult Post Covid Clinic Referral     Leadless EKG Monitor 8 to 14 Days     DISCONTINUED: ipratropium (ATROVENT HFA) 17 MCG/ACT inhaler   2. Palpitations  R00.2 Leadless EKG Monitor 8 to 14 Days         Review of external notes as documented elsewhere in note  No LOS data to display   Time spent doing chart review, history and exam, documentation and further activities per the note         Return in about 1 month (around 2/3/2022) for For Re-Assessment.    Brooks Osuna MD  [facility]    Subjective   Linus is a 36 year old who presents for the following health issues     HPI     Patient had covid beginning of December.  Persistent cough, chest pains, fatigue and headaches.  OTC Ibuprofen of Tylenol as needed. Sense of smell that is not back completely.  Cough drops.    COVID thanksgiving  Residual symptoms  Cough - persistent, all the time, dry cough, wheezy      Irregular heartbeat  Episodes last 10-15 mins  Once per month  No particular trigger  Has been going on for about 9 months  Ny fabiola a few days ago just before bed - started while seated - lasted 45 mins      Review of Systems   Constitutional, HEENT, cardiovascular, pulmonary, gi and gu systems are negative, except as otherwise noted.      Objective           Vitals:  No vitals were obtained today due to virtual visit.    Physical Exam   GENERAL: Healthy, alert and no distress  EYES: Eyes grossly normal to inspection.  No discharge or erythema, or obvious scleral/conjunctival abnormalities.  RESP: No audible wheeze,  cough, or visible cyanosis.  No visible retractions or increased work of breathing.    SKIN: Visible skin clear. No significant rash, abnormal pigmentation or lesions.  NEURO: Cranial nerves grossly intact.  Mentation and speech appropriate for age.  PSYCH: Mentation appears normal, affect normal/bright, judgement and insight intact, normal speech and appearance well-groomed.                Video-Visit Details    Type of service:  Video Visit    Video Length 40 mins    Originating Location (pt. Location): Home    Distant Location (provider location):  [facility]     Platform used for Video Visit: [employer]

## 2022-01-03 NOTE — TELEPHONE ENCOUNTER
Atrovent inhaler is over $400.00. Can we please get an alternative?    Thank you,  Moira Ortez, PharmD  [facility]  [phone]

## 2022-01-05 ENCOUNTER — TELEPHONE (OUTPATIENT)
Dept: FAMILY MEDICINE | Facility: CLINIC | Age: 37
End: 2022-01-05
Payer: COMMERCIAL

## 2022-01-05 DIAGNOSIS — U09.9 POST-COVID-19 SYNDROME MANIFESTING AS CHRONIC COUGH: ICD-10-CM

## 2022-01-05 DIAGNOSIS — R05.3 CHRONIC COUGH: Primary | ICD-10-CM

## 2022-01-05 DIAGNOSIS — R05.3 POST-COVID-19 SYNDROME MANIFESTING AS CHRONIC COUGH: ICD-10-CM

## 2022-01-05 NOTE — TELEPHONE ENCOUNTER
Atrovent and Combivent still too expensive. Patient wondering if there is anything else for him.    Thank you,  Moira Ortez, PharmD  [facility]  [phone]

## 2022-01-07 ENCOUNTER — TELEPHONE (OUTPATIENT)
Dept: PHYSICAL MEDICINE AND REHAB | Facility: CLINIC | Age: 37
End: 2022-01-07
Payer: COMMERCIAL

## 2022-01-07 NOTE — TELEPHONE ENCOUNTER
Health Call Center    Phone Message    May a detailed message be left on voicemail: yes     Reason for Call: Appointment Intake    Referring Provider Name: Brooks Egan MD   Diagnosis and/or Symptoms: Post viral cough syndrome    Patient is symptomatic. Please review and contact the patient to get him in sooner.    Patient has been scheduled for April and has been added to the wait list.    Patient has a persistent cough and lack of smell.  But he is more concerned about the cough.    Action Taken: Message routed to:  [facility] PMR    Travel Screening: Not Applicable

## 2022-01-07 NOTE — TELEPHONE ENCOUNTER
Talked with patient and explained to him that other than putting him on the wait list, there is not any other way to expedite his appointment at this time. Recommended to him to consult PCP with acute symptoms or go to ED with anything more emergent in the meantime until long-term care plan can be created with PMR providers at the April 2022 appt. We will try our best to get him in sooner if there is a cancellation.     Rob Rubalcava

## 2022-01-08 ENCOUNTER — HEALTH MAINTENANCE LETTER (OUTPATIENT)
Age: 37
End: 2022-01-08

## 2022-01-24 ENCOUNTER — ALLIED HEALTH/NURSE VISIT (OUTPATIENT)
Dept: FAMILY MEDICINE | Facility: CLINIC | Age: 37
End: 2022-01-24
Payer: COMMERCIAL

## 2022-01-24 ENCOUNTER — ANCILLARY PROCEDURE (OUTPATIENT)
Dept: CARDIOLOGY | Facility: CLINIC | Age: 37
End: 2022-01-24
Attending: FAMILY MEDICINE
Payer: COMMERCIAL

## 2022-01-24 DIAGNOSIS — R00.2 PALPITATIONS: Primary | ICD-10-CM

## 2022-01-24 DIAGNOSIS — R05.8 POST-VIRAL COUGH SYNDROME: ICD-10-CM

## 2022-01-24 DIAGNOSIS — R00.2 PALPITATIONS: ICD-10-CM

## 2022-01-24 PROCEDURE — 99207 PR NO CHARGE NURSE ONLY: CPT

## 2022-04-04 ENCOUNTER — MYC MEDICAL ADVICE (OUTPATIENT)
Dept: FAMILY MEDICINE | Facility: CLINIC | Age: 37
End: 2022-04-04
Payer: COMMERCIAL

## 2022-05-19 ENCOUNTER — OFFICE VISIT (OUTPATIENT)
Dept: FAMILY MEDICINE | Facility: CLINIC | Age: 37
End: 2022-05-19
Payer: COMMERCIAL

## 2022-05-19 VITALS
WEIGHT: 155.4 LBS | OXYGEN SATURATION: 98 % | DIASTOLIC BLOOD PRESSURE: 82 MMHG | SYSTOLIC BLOOD PRESSURE: 114 MMHG | HEIGHT: 68 IN | TEMPERATURE: 98.2 F | BODY MASS INDEX: 23.55 KG/M2 | HEART RATE: 62 BPM

## 2022-05-19 DIAGNOSIS — M65.4 DE QUERVAIN'S DISEASE (TENOSYNOVITIS): Primary | ICD-10-CM

## 2022-05-19 PROCEDURE — 99214 OFFICE O/P EST MOD 30 MIN: CPT | Performed by: FAMILY MEDICINE

## 2022-05-19 NOTE — PROGRESS NOTES
"  Assessment & Plan       ICD-10-CM    1. De Quervain's disease (tenosynovitis)  M65.4 REVIEW OF HEALTH MAINTENANCE PROTOCOL ORDERS     Orthopedic  Referral     Occupational Therapy Referral     Home exercises printed  Ice x 20 mins 2-3x per day  Occupational med, sports med to consider injections    Review of external notes as documented elsewhere in note             Return in about 1 week (around 5/26/2022) for With Specialist.    Brooks Osuna MD  [facility] NUVIA Barriga is a 37 year old who presents for the following health issues     Musculoskeletal Problem    History of Present Illness       Reason for visit:  Wrist pain  Symptom onset:  More than a month  Symptoms include:  Tender and sharp pain  Symptom intensity:  Mild  Symptom progression:  Improving  Had these symptoms before:  No  What makes it worse:  Typing, katie with a controller  What makes it better:  Rest    He eats 2-3 servings of fruits and vegetables daily.He consumes 0 sweetened beverage(s) daily.He exercises with enough effort to increase his heart rate 30 to 60 minutes per day.  He exercises with enough effort to increase his heart rate 4 days per week.   He is taking medications regularly.     Right thumb  Typing  Video games  Grabbing/opening jar  X 2 months            Review of Systems   Constitutional, HEENT, cardiovascular, pulmonary, gi and gu systems are negative, except as otherwise noted.      Objective    /82   Pulse 62   Temp 98.2  F (36.8  C) (Oral)   Ht 1.727 m (5' 8\")   Wt 70.5 kg (155 lb 6.4 oz)   SpO2 98%   BMI 23.63 kg/m    Body mass index is 23.63 kg/m .  Physical Exam  Constitutional:       General: He is not in acute distress.     Appearance: Normal appearance. He is well-developed.   HENT:      Head: Normocephalic and atraumatic.      Right Ear: External ear normal.      Left Ear: External ear normal.   Eyes:      General: No scleral icterus.     " Conjunctiva/sclera: Conjunctivae normal.   Pulmonary:      Effort: Pulmonary effort is normal.   Musculoskeletal:      Cervical back: Normal range of motion and neck supple.      Comments: Pain reproduced with forced thumb opposition into closed palm   Skin:     General: Skin is warm and dry.   Neurological:      Mental Status: He is alert and oriented to person, place, and time.   Psychiatric:         Mood and Affect: Mood normal.         Behavior: Behavior normal.         Thought Content: Thought content normal.         Judgment: Judgment normal.

## 2022-06-01 ENCOUNTER — ANCILLARY PROCEDURE (OUTPATIENT)
Dept: GENERAL RADIOLOGY | Facility: CLINIC | Age: 37
End: 2022-06-01
Attending: PEDIATRICS
Payer: COMMERCIAL

## 2022-06-01 ENCOUNTER — OFFICE VISIT (OUTPATIENT)
Dept: ORTHOPEDICS | Facility: CLINIC | Age: 37
End: 2022-06-01

## 2022-06-01 VITALS
BODY MASS INDEX: 23.49 KG/M2 | SYSTOLIC BLOOD PRESSURE: 114 MMHG | DIASTOLIC BLOOD PRESSURE: 80 MMHG | HEIGHT: 68 IN | WEIGHT: 155 LBS

## 2022-06-01 DIAGNOSIS — M25.531 RIGHT WRIST PAIN: Primary | ICD-10-CM

## 2022-06-01 DIAGNOSIS — M25.531 RIGHT WRIST PAIN: ICD-10-CM

## 2022-06-01 DIAGNOSIS — M65.4 DE QUERVAIN'S DISEASE (TENOSYNOVITIS): ICD-10-CM

## 2022-06-01 PROCEDURE — 73110 X-RAY EXAM OF WRIST: CPT | Mod: TC | Performed by: RADIOLOGY

## 2022-06-01 PROCEDURE — 99204 OFFICE O/P NEW MOD 45 MIN: CPT | Performed by: PEDIATRICS

## 2022-06-01 NOTE — PROGRESS NOTES
ASSESSMENT & PLAN    Linus was seen today for pain.    Diagnoses and all orders for this visit:    Right wrist pain  -     XR Wrist Right G/E 3 Views; Future    De Quervain's disease (tenosynovitis)  -     Orthopedic  Referral      Chronic issue. Previous notes reviewed.    Some pain also in first CMC joint area by history, but exam c/w tendon source.  We discussed the following: symptom treatment, activity modification/rest, imaging, rehab, injection therapy, medication, potential for improvement with time and support for the affected area. Following discussion, plan:  Therapy planned, has appointment already.  Does not desire injection, and I agree with rehab approach first, though injection was offered.  Questions answered. Discussed signs and symptoms that may indicate more serious issues; the patient was instructed to seek appropriate care if noted. Linus indicates understanding of these issues and agrees with the plan.        See Patient Instructions  Patient Instructions   Most consistent with tendon related issue at the radial wrist and hand, de Quervain.  Previous soreness also sounds to be in the area of the first CMC joint, but x-rays are reassuring today.  We reviewed options with symptom management, activity modification, imaging, rehab, support with bracing, use of medications, also injection.  Plan the hand therapy, as scheduled.  Okay to continue with bracing that you have.  We may also discuss this further with hand therapist, if interested in a different level of support for the hand and wrist.  Could consider use of topical medication such as Voltaren (diclofenac) gel, Elavil over-the-counter.  Finally, we discussed consideration of steroid injection.  Okay to hold for now, given some improvement overall by history, and with plan for therapy.  Monitor course with therapy over 6 weeks to begin.    If you have any further questions for your physician or physician s care team you can call  "[phone] and use option 3 to leave a voice message. Calls received during business hours will be returned same day.        Adalberto Tsai [facility] [place]    -----  Chief Complaint   Patient presents with     Right Wrist - Pain       SUBJECTIVE  Linus Durand is a/an 37 year old male who is seen in consultation at the request of  Brooks Egan M.D. for evaluation of right wrist and thumb.  No known injury.  Pain has been present for the past 5-6 months.  Pain mostly in his thumb, not much into forearm.    Pain with pinching, lifting, fine motor skills, flexing thumb     The patient is seen by themselves.  The patient is Left handed    Onset: 5-6 month(s) ago. Reports insidious onset without acute precipitating event.  Location of Pain: right thumb   Worsened by: use   Better with: ice  Treatments tried: ice, home exercises and casting/splinting/bracing  Associated symptoms: weakness of thumb,      Orthopedic/Surgical history: NO  Social History/Occupation:     No family history pertinent to patient's problem today.     **  Question if related to some repetitive motion. Does a lot of typing at work. Prior to that, had been doing some katie as well, but stopped that after onset of pain.  Some slight swelling previously, now improved.   Has been using bracing, pain dulled with that.  Now having intermittent flares of pain, does icing, then pain can improve.  No noted joint noise.      REVIEW OF SYSTEMS:  Review of Systems   All other systems reviewed and are negative.        OBJECTIVE:  /80   Ht 1.727 m (5' 8\")   Wt 70.3 kg (155 lb)   BMI 23.57 kg/m     General: healthy, alert and in no distress  HEENT: no scleral icterus or conjunctival erythema  Skin: no suspicious lesions or rash. No jaundice.  CV: distal perfusion intact   Resp: normal respiratory effort without conversational dyspnea   Psych: normal mood and affect  Gait: normal " steady gait with appropriate coordination and balance   Neuro: Normal light sensory exam of  extremity       Hand/wrist (right):    Inspection:  No deformity noted.  No swelling. No ecchymosis.    Motion:  Forearm pronation , forearm supination .  Wrist flexion   Wrist extension   Wrist radial deviation   Wrist ulnar deviation   Digit motion   All grossly full  Some pain with thumb motion    Strength:  Some discomfort resisted thumb extension, adduction, less with abduction    Sensation:  Grossly intact light touch.    Radial pulses normal, +2/4, capillary refill brisk.    Palpation:  Tender first dorsal compartment, dorsal first CMC joint  Nontender remainder, thenar eminence    Thumb grind neg.    Finkelstein pos.       RADIOLOGY:  I independently ordered, visualized and reviewed these images with the patient  No acute bony abnormality.    Recent Results (from the past 24 hour(s))   XR Wrist Right G/E 3 Views    Narrative    XR WRIST RIGHT G/E 3 VIEWS 6/1/2022 9:02 AM     HISTORY: Right wrist pain    COMPARISON: None.      Impression    IMPRESSION: No fractures are evident. Normal joint spacing and  alignment. The soft tissues are unremarkable.    STAN WARD MD         SYSTEM ID:  [id number]             Review of prior external note(s) from - referring  Review of the result(s) of each unique test - imaging  Ordering of each unique test

## 2022-06-01 NOTE — LETTER
6/1/2022         RE: Linus Durand  [address] [facility] [address]        Dear Colleague,    Thank you for referring your patient, Linus Durand, to the [facility] [place]. Please see a copy of my visit note below.    ASSESSMENT & PLAN    Linus was seen today for pain.    Diagnoses and all orders for this visit:    Right wrist pain  -     XR Wrist Right G/E 3 Views; Future    De Quervain's disease (tenosynovitis)  -     Orthopedic  Referral      Chronic issue. Previous notes reviewed.    Some pain also in first CMC joint area by history, but exam c/w tendon source.  We discussed the following: symptom treatment, activity modification/rest, imaging, rehab, injection therapy, medication, potential for improvement with time and support for the affected area. Following discussion, plan:  Therapy planned, has appointment already.  Does not desire injection, and I agree with rehab approach first, though injection was offered.  Questions answered. Discussed signs and symptoms that may indicate more serious issues; the patient was instructed to seek appropriate care if noted. Linus indicates understanding of these issues and agrees with the plan.        See Patient Instructions  Patient Instructions   Most consistent with tendon related issue at the radial wrist and hand, de Quervain.  Previous soreness also sounds to be in the area of the first CMC joint, but x-rays are reassuring today.  We reviewed options with symptom management, activity modification, imaging, rehab, support with bracing, use of medications, also injection.  Plan the hand therapy, as scheduled.  Okay to continue with bracing that you have.  We may also discuss this further with hand therapist, if interested in a different level of support for the hand and wrist.  Could consider use of topical medication such as Voltaren (diclofenac) gel, Elavil over-the-counter.  Finally, we discussed consideration of  "steroid injection.  Okay to hold for now, given some improvement overall by history, and with plan for therapy.  Monitor course with therapy over 6 weeks to begin.    If you have any further questions for your physician or physician s care team you can call [phone] and use option 3 to leave a voice message. Calls received during business hours will be returned same day.        Adalberto Tsai DO  [facility] [place]    -----  Chief Complaint   Patient presents with     Right Wrist - Pain       SUBJECTIVE  Linus Durand is a/an 37 year old male who is seen in consultation at the request of  Brooks Egan M.D. for evaluation of right wrist and thumb.  No known injury.  Pain has been present for the past 5-6 months.  Pain mostly in his thumb, not much into forearm.    Pain with pinching, lifting, fine motor skills, flexing thumb     The patient is seen by themselves.  The patient is Left handed    Onset: 5-6 month(s) ago. Reports insidious onset without acute precipitating event.  Location of Pain: right thumb   Worsened by: use   Better with: ice  Treatments tried: ice, home exercises and casting/splinting/bracing  Associated symptoms: weakness of thumb,      Orthopedic/Surgical history: NO  Social History/Occupation:     No family history pertinent to patient's problem today.     **  Question if related to some repetitive motion. Does a lot of typing at work. Prior to that, had been doing some katie as well, but stopped that after onset of pain.  Some slight swelling previously, now improved.   Has been using bracing, pain dulled with that.  Now having intermittent flares of pain, does icing, then pain can improve.  No noted joint noise.      REVIEW OF SYSTEMS:  Review of Systems   All other systems reviewed and are negative.        OBJECTIVE:  /80   Ht 1.727 m (5' 8\")   Wt 70.3 kg (155 lb)   BMI 23.57 kg/m     General: healthy, alert and " in no distress  HEENT: no scleral icterus or conjunctival erythema  Skin: no suspicious lesions or rash. No jaundice.  CV: distal perfusion intact   Resp: normal respiratory effort without conversational dyspnea   Psych: normal mood and affect  Gait: normal steady gait with appropriate coordination and balance   Neuro: Normal light sensory exam of  extremity       Hand/wrist (right):    Inspection:  No deformity noted.  No swelling. No ecchymosis.    Motion:  Forearm pronation , forearm supination .  Wrist flexion   Wrist extension   Wrist radial deviation   Wrist ulnar deviation   Digit motion   All grossly full  Some pain with thumb motion    Strength:  Some discomfort resisted thumb extension, adduction, less with abduction    Sensation:  Grossly intact light touch.    Radial pulses normal, +2/4, capillary refill brisk.    Palpation:  Tender first dorsal compartment, dorsal first CMC joint  Nontender remainder, thenar eminence    Thumb grind neg.    Finkelstein pos.       RADIOLOGY:  I independently ordered, visualized and reviewed these images with the patient  No acute bony abnormality.    Recent Results (from the past 24 hour(s))   XR Wrist Right G/E 3 Views    Narrative    XR WRIST RIGHT G/E 3 VIEWS 6/1/2022 9:02 AM     HISTORY: Right wrist pain    COMPARISON: None.      Impression    IMPRESSION: No fractures are evident. Normal joint spacing and  alignment. The soft tissues are unremarkable.    ADALBERTO WARD MD         SYSTEM ID:  [id number]             Review of prior external note(s) from - referring  Review of the result(s) of each unique test - imaging  Ordering of each unique test            Again, thank you for allowing me to participate in the care of your patient.        Sincerely,        Adalberto Tsai DO

## 2022-06-01 NOTE — PATIENT INSTRUCTIONS
Most consistent with tendon related issue at the radial wrist and hand, de Quervain.  Previous soreness also sounds to be in the area of the first CMC joint, but x-rays are reassuring today.  We reviewed options with symptom management, activity modification, imaging, rehab, support with bracing, use of medications, also injection.  Plan the hand therapy, as scheduled.  Okay to continue with bracing that you have.  We may also discuss this further with hand therapist, if interested in a different level of support for the hand and wrist.  Could consider use of topical medication such as Voltaren (diclofenac) gel, Elavil over-the-counter.  Finally, we discussed consideration of steroid injection.  Okay to hold for now, given some improvement overall by history, and with plan for therapy.  Monitor course with therapy over 6 weeks to begin.    If you have any further questions for your physician or physician s care team you can call [phone] and use option 3 to leave a voice message. Calls received during business hours will be returned same day.

## 2022-06-02 ENCOUNTER — THERAPY VISIT (OUTPATIENT)
Dept: OCCUPATIONAL THERAPY | Facility: CLINIC | Age: 37
End: 2022-06-02
Attending: FAMILY MEDICINE
Payer: COMMERCIAL

## 2022-06-02 DIAGNOSIS — M65.4 DE QUERVAIN'S DISEASE (TENOSYNOVITIS): Primary | ICD-10-CM

## 2022-06-02 DIAGNOSIS — M79.644 THUMB PAIN, RIGHT: ICD-10-CM

## 2022-06-02 PROCEDURE — 97760 ORTHOTIC MGMT&TRAING 1ST ENC: CPT | Mod: GO | Performed by: OCCUPATIONAL THERAPIST

## 2022-06-02 PROCEDURE — 97165 OT EVAL LOW COMPLEX 30 MIN: CPT | Mod: GO | Performed by: OCCUPATIONAL THERAPIST

## 2022-06-02 PROCEDURE — 97110 THERAPEUTIC EXERCISES: CPT | Mod: GO | Performed by: OCCUPATIONAL THERAPIST

## 2022-06-02 NOTE — PROGRESS NOTES
Hand Therapy Initial Evaluation    Current Date:  6/2/2022    Diagnosis: R DeQuervain's  DOI: January 2022 (6/1/2022 DO order date)    Subjective:  Linus Durand is a 37 year old male.    Answers for HPI/ROS submitted by the patient on 6/2/2022  Reason for Visit:: De Quervain's Tenosynovitis - right hand  When problem began:: 1/15/2022  How problem occurred:: Repetitive motion injury  Number scale: 1/10  General health as reported by patient: good  Medical allergies: other  Other Allergies Detail: cefaclor  Surgeries: orthopedic surgery  Occupation::   What are your primary job tasks: computer work, prolonged sitting, repetitive tasks    Occupational Profile Information:  Left hand dominant  Prior functional level:  independent-shared household chores  Patient reports symptoms of pain and weakness/loss of strength  Special tests:  x-ray.    Previous treatment: OTC wrist brace including the thumb  Barriers include:none  Mobility: No difficulty  Transportation: drives  Currently working in normal job without restrictions  Leisure activities/hobbies: [place], has not participated in hobby d/t current injury    Functional Outcome Measure:   Upper Extremity Functional Index Score:  SCORE:   Column Totals: /80: (P) 74   (A lower score indicates greater disability.)    Objective:  Pain Level (Scale 0-10):   6/2/2022   At Rest 0-2/10   With Use 7-8/10     Pain Description:  Date 6/2/2022   Location Thumb - adductors/webspace, dorsum, CMC area   Pain Quality Sharp, Aching and Tender   Frequency Intermittent    Pain is worst  daytime   Exacerbated by  work, typing, mouse use, pinching, opening bags   Relieved by cold and rest   Progression Unchanged     Sensation   WNL throughout all nerve distributions; per patient report    Edema   - none  + mild    ++ moderate    +++ severe    6/2/2022   1st DC -   Radial Styloid -      ROM  Pain Report: - none  + mild    ++ moderate    +++ severe   Thumb 6/2/2022 6/2/2022    AROM (PROM) L R   MP /73 /59   IP /59 /33   RABD 42 46   PABD 45 45   Opposition NT NT     Wrist 6/2/2022 6/2/2022   AROM (PROM) L R   Extension WNL WNL   Flexion WNL WNL   RD WNL WNL   UD 29 30 +   UD with Th Flex 15 11 +     Resisted Testing  Pain Report: - none  + mild    ++ moderate    +++ severe    6/2/2022   APL 4/5 +   EPB 4/5 +   EPL 4/5 +   FCR 5/5 -     Strength   (Measured in pounds)  Pain Report: - none  + mild    ++ moderate    +++ severe    6/2/2022 6/2/2022   Trials L R   1  2  3 117 104   Average 117 104     Lat Pinch 6/2/2022 6/2/2022   Trials L R   1  2  3 20 19   Average 20 19     3 Pt Pinch 6/2/2022 6/2/2022   Trials L R   1  2  3 19 21 +   Average 19 21 +     Special Tests   Pain Report:  - none    + mild    ++ moderate    +++ severe    6/2/2022   Finkelsteins +   Radial Nerve Tinel's (DRSN) -   WHAT test +     Palpation  Pain Report:  - none    + mild    ++ moderate    +++ severe    6/2/2022   Radial Styloid -   1st DC +   FCR +   Thumb CMC +   PIN Site -   Extensor Wad -     Assessment:  Patient presents with symptoms consistent with diagnosis of right thumb DeQuervain's, with conservative intervention.     Patient's limitations or Problem List includes:  Pain, Decreased ROM/motion, Weakness and Decreased stability of the right wrist, hand and thumb which interferes with the patient's ability to perform Self Care Tasks (eating), Work Tasks and Household Chores as compared to previous level of function.    Rehab Potential:  Excellent - Return to full activity, no limitations    Patient will benefit from skilled Occupational Therapy to increase ROM, overall strength and stability of thumb and decrease pain and edema to return to previous activity level and resume normal daily tasks and to reach their rehab potential.    Barriers to Learning:  No barrier    Communication Issues:  Patient appears to be able to clearly communicate and understand verbal and written communication and follow  directions correctly.    Chart Review: Chart Review, Brief history including review of medical and/or therapy records relating to the presenting problem and Simple history review with patient    Identified Performance Deficits: feeding, home establishment and management, meal preparation and cleanup, shopping, work and leisure activities    Assessment of Occupational Performance:  3-5 Performance Deficits    Clinical Decision Making (Complexity): Low complexity    Treatment Explanation:  The following has been discussed with the patient:  RX ordered/plan of care  Anticipated outcomes  Possible risks and side effects    Plan:  Frequency:  1 X week, once daily  Duration:  for 6 weeks    Treatment Plan:   Modalities:  US, Fluidotherapy and Paraffin  Therapeutic Exercise:  AROM, AAROM, PROM, Tendon Gliding, Blocking, Reverse Blocking, Place and Hold, Contract Relax, Extensor Tracking, Isotonics, Isometrics and Stabilization   Neuromuscular re-education:  Nerve Gliding, Coordination/Dexterity, Sensory re-education, Desensitization, Kinesthetic Training, Proprioceptive Training, Posture, Kinesiotaping, Strain Counter Strain, Isometrics, Stabilization  Manual Techniques:  Coordination/Dexterity, Joint mobilization, Scar mobilization, Friction massage, Myofascial release, Manual edema mobilization  Orthotic Fabrication:  Static and Forearm based  Self Care:  Self Care Tasks, Ergonomic Considerations and Work Tasks    Discharge Plan:    Achieve all LTG.  Independent in home treatment program.  Reach maximal therapeutic benefit.    Home Exercise Program:  Ergonomic keyboard setup  [place]ttobock orthosis  deQuervain Overview  EMR Notes  HEP - Sets  Reps  Sessions per day  Notes  Warmth  EMR Notes  HEP - Sets  Reps  Sessions per day 1  Notes Soak your hand in a comfortably warm bath with 1/3-1/2 cup of epsom salts for 10-15 minutes Use a warm/comfortably heating pad or hot rice bag over your hand for 10-15 minutes first thing in the  "morning, prior to exercises, or when experiencing pain.  Thumb Stabilization Web Space Release Method 1 with Clip  EMR Notes  HEP - Sets 1  Reps  Sessions per day 2-3  Notes 1-3min or per comfort. OK to use rubberbands to lessen the pressure  Thumb webspace stretch at edge of table  EMR Notes  HEP - Sets 1  Reps 3-5 - 15-20 second hold per repetition  Sessions per day 2-3  Notes This exercise should be pain free  Thumb Stabilization C with ball  EMR Notes  HEP - Sets 1  Reps 10  Sessions per day 2-3  Notes  Thumb Stabilization Strengthening Isometric C  EMR Notes  HEP - Sets 1  Reps 5  Sessions per day 2-3  Notes use a soft ball/tennis ball to position your thumb in a \"C\" curve, removing the ball while maintaining the curve. Hold for as long as tolerated, resting when you feel pain or when your thumb shifts out of the proper \"C\" position. Start off by holding for 5-10 seconds, then work up to 30 seconds as tolerated  Thumb Stabilization 1st Dorsal Interosseous  EMR Notes  HEP - Sets 1  Reps 5-10  Sessions per day 3  Notes Keep your index finger in a curved position when you lift it up, make sure your thumb is in the stable \"C\" position throughout exercise. It is OK to use the tennis ball to position your thumb and fingers throughout this exercise      Next Visit:  Check fit of orthosis  Review/progress HEP    "

## 2022-06-09 ENCOUNTER — THERAPY VISIT (OUTPATIENT)
Dept: OCCUPATIONAL THERAPY | Facility: CLINIC | Age: 37
End: 2022-06-09
Attending: FAMILY MEDICINE
Payer: COMMERCIAL

## 2022-06-09 DIAGNOSIS — M79.644 THUMB PAIN, RIGHT: ICD-10-CM

## 2022-06-09 DIAGNOSIS — M65.4 DE QUERVAIN'S DISEASE (TENOSYNOVITIS): Primary | ICD-10-CM

## 2022-06-09 PROCEDURE — 97110 THERAPEUTIC EXERCISES: CPT | Mod: GO | Performed by: OCCUPATIONAL THERAPIST

## 2022-06-09 PROCEDURE — 97763 ORTHC/PROSTC MGMT SBSQ ENC: CPT | Mod: GO | Performed by: OCCUPATIONAL THERAPIST

## 2022-06-09 NOTE — PROGRESS NOTES
SOAP note objective information for 6/9/2022.    Diagnosis: R Juanyrvain's  DOI: January 2022 (6/1/2022 DO order date)    Subjective:  Linus Durand is a 37 year old male.    Answers for HPI/ROS submitted by the patient on 6/2/2022  Reason for Visit:: De Quervain's Tenosynovitis - right hand  When problem began:: 1/15/2022  How problem occurred:: Repetitive motion injury  Number scale: 1/10  General health as reported by patient: good  Medical allergies: other  Other Allergies Detail: cefaclor  Surgeries: orthopedic surgery  Occupation::   What are your primary job tasks: computer work, prolonged sitting, repetitive tasks    Objective:  Pain Level (Scale 0-10):   6/2/2022   At Rest 0-2/10   With Use 7-8/10     Pain Description:  Date 6/2/2022   Location Thumb - adductors/webspace, dorsum, CMC area   Pain Quality Sharp, Aching and Tender   Frequency Intermittent    Pain is worst  daytime   Exacerbated by  work, typing, mouse use, pinching, opening bags   Relieved by cold and rest   Progression Unchanged     Sensation   WNL throughout all nerve distributions; per patient report    Edema   - none  + mild    ++ moderate    +++ severe    6/2/2022   1st DC -   Radial Styloid -      ROM  Pain Report: - none  + mild    ++ moderate    +++ severe   Thumb 6/2/2022 6/2/2022   AROM (PROM) L R   MP /73 /59   IP /59 /33   RABD 42 46   PABD 45 45   Opposition NT NT     Wrist 6/2/2022 6/2/2022   AROM (PROM) L R   Extension WNL WNL   Flexion WNL WNL   RD WNL WNL   UD 29 30 +   UD with Th Flex 15 11 +     Resisted Testing  Pain Report: - none  + mild    ++ moderate    +++ severe    6/2/2022   APL 4/5 +   EPB 4/5 +   EPL 4/5 +   FCR 5/5 -     Strength   (Measured in pounds)  Pain Report: - none  + mild    ++ moderate    +++ severe    6/2/2022 6/2/2022   Trials L R   1  2  3 117 104   Average 117 104     Lat Pinch 6/2/2022 6/2/2022   Trials L R   1  2  3 20 19   Average 20 19     3 Pt Pinch 6/2/2022 6/2/2022    Trials L R   1  2  3 19 21 +   Average 19 21 +     Special Tests   Pain Report:  - none    + mild    ++ moderate    +++ severe    6/2/2022   Finkelsteins +   Radial Nerve Tinel's (DRSN) -   WHAT test +     Palpation  Pain Report:  - none    + mild    ++ moderate    +++ severe    6/2/2022   Radial Styloid -   1st DC +   FCR +   Thumb CMC +   PIN Site -   Extensor Wad -     Please refer to the daily flowsheet for treatment today, total treatment time and time spent performing 1:1 timed codes.    Next Visit:  Check fit of orthosis  Review/progress HEP

## 2022-06-23 ENCOUNTER — THERAPY VISIT (OUTPATIENT)
Dept: OCCUPATIONAL THERAPY | Facility: CLINIC | Age: 37
End: 2022-06-23
Payer: COMMERCIAL

## 2022-06-23 DIAGNOSIS — M65.4 DE QUERVAIN'S DISEASE (TENOSYNOVITIS): Primary | ICD-10-CM

## 2022-06-23 DIAGNOSIS — M79.644 THUMB PAIN, RIGHT: ICD-10-CM

## 2022-06-23 PROCEDURE — 97110 THERAPEUTIC EXERCISES: CPT | Mod: GO | Performed by: OCCUPATIONAL THERAPIST

## 2022-06-23 NOTE — PROGRESS NOTES
"Discharge Note - Hand Therapy    Current Date:  6/23/2022    Diagnosis: R Josi's  DOI: January 2022 (6/1/2022 DO order date)    Reporting period is from 6/2/2022 to 6/23/2022    Subjective:  Linus Durand is a 37 year old male.    Subjective:   Subjective changes as noted by patient:  Subjective: \"Pretty good, getting better. Less painful, the strengthening exercises have gotten easier. I think things are working.\"   Functional changes noted by patient:  Improvement in Work Tasks and Household Chores  Patient has noted adverse reaction to:  None    Answers for HPI/ROS submitted by the patient on 6/2/2022  Reason for Visit:: De Quervain's Tenosynovitis - right hand  When problem began:: 1/15/2022  How problem occurred:: Repetitive motion injury  Number scale: 1/10  General health as reported by patient: good  Medical allergies: other  Other Allergies Detail: cefaclor  Surgeries: orthopedic surgery  Occupation::   What are your primary job tasks: computer work, prolonged sitting, repetitive tasks    Objective:  Pain Level (Scale 0-10):   6/2/2022 6/23/2022   At Rest 0-2/10 0/10   With Use 7-8/10 3/10     Pain Description:  Date 6/2/2022   Location Thumb - adductors/webspace, dorsum, CMC area   Pain Quality Sharp, Aching and Tender   Frequency Intermittent    Pain is worst  daytime   Exacerbated by  work, typing, mouse use, pinching, opening bags   Relieved by cold and rest   Progression Unchanged     Sensation   WNL throughout all nerve distributions; per patient report    Edema   - none  + mild    ++ moderate    +++ severe    6/2/2022   1st DC -   Radial Styloid -      ROM  Pain Report: - none  + mild    ++ moderate    +++ severe   Thumb 6/2/2022 6/2/2022 6/23/22   AROM (PROM) L R R   MP /73 /59 /76   IP /59 /33 /53   RABD 42 46    PABD 45 45    Opposition NT NT      Wrist 6/2/2022 6/2/2022   AROM (PROM) L R   Extension WNL WNL   Flexion WNL WNL   RD WNL WNL   UD 29 30 +   UD with Th Flex 15 " 11 +     Resisted Testing  Pain Report: - none  + mild    ++ moderate    +++ severe    6/2/2022 6/23/33   APL 4/5 + 5/5 -   EPB 4/5 + 5/5 -   EPL 4/5 + 5/5 -   FCR 5/5 -      Strength   (Measured in pounds)  Pain Report: - none  + mild    ++ moderate    +++ severe    6/2/2022 6/2/2022   Trials L R   1  2  3 117 104   Average 117 104     Lat Pinch 6/2/2022 6/2/2022   Trials L R   1  2  3 20 19   Average 20 19     3 Pt Pinch 6/2/2022 6/2/2022   Trials L R   1  2  3 19 21 +   Average 19 21 +     Special Tests   Pain Report:  - none    + mild    ++ moderate    +++ severe    6/2/2022   Finkelsteins +   Radial Nerve Tinel's (DRSN) -   WHAT test +     Palpation  Pain Report:  - none    + mild    ++ moderate    +++ severe    6/2/2022 6/23/22   Radial Styloid - NT   1st DC + -   FCR + -   Thumb CMC + -   PIN Site - -   Extensor Wad - -     Please refer to the daily flowsheet for treatment today, total treatment time and time spent performing 1:1 timed codes.    Assessment:  Response to therapy has been improvement to:  ROM of Thumb:  All Planes  Pain:  frequency is less and intensity of pain is decreased  Appropriateness of Rx I have re-evaluated this patient and find that the nature, scope, duration and intensity of the therapy is appropriate for the medical condition of the patient.  Overall Assessment:  Patient's symptoms are resolving.  Patient has met Short and Long Term Treatment Goals.  Patient is ready to be discharged from therapy and continue their home treatment program.  STG/LTG:  See goal sheet for details and updates.    Plan:  Frequency/Duration:  Discharge from Hand Therapy; continue home program.    Recommendations for Home Program - see below    Home Exercise Program:  deQuervain Overview  EMR Notes  HEP - Sets  Reps  Sessions per day  Notes  Warmth  EMR Notes  HEP - Sets  Reps  Sessions per day 1  Notes Soak your hand in a comfortably warm bath with 1/3-1/2 cup of epsom salts for 10-15 minutes Use a  "warm/comfortably heating pad or hot rice bag over your hand for 10-15 minutes first thing in the morning, prior to exercises, or when experiencing pain.  Thumb Stabilization Web Space Release Method 1 with Clip  EMR Notes  HEP - Sets 1  Reps  Sessions per day as needed  Notes 1-3min or per comfort. OK to use rubberbands to lessen the pressure  Thumb webspace stretch at edge of table  EMR Notes  HEP - Sets 1  Reps 3-5 - 15-20 second hold per repetition  Sessions per day as needed  Notes This exercise should be pain free  Thumb Stabilization, Repositioning, Method 1(on chest)  EMR Notes  HEP - Sets 1  Reps 3 - 15-30 seconds each repetition  Sessions per day as needed  Notes Hold your left hand flat against your chest, using your right hand to \"pull\" your metacarpal bone away from your chest. Your left hand and right hand should be \"competing\" against one another. Hold for 15-30 seconds or as tolerated for 3 repetitions total  Thumb Stabilization C with ball  EMR Notes  HEP - Sets 2  Reps 10  Sessions per day every other day  Notes  Thumb Stabilization 1st Dorsal Interosseous  EMR Notes  HEP - Sets 2  Reps 5-10  Sessions per day every other day  Notes Keep your index finger in a curved position when you lift it up, make sure your thumb is in the stable \"C\" position throughout exercise. It is OK to use the tennis ball to position your thumb and fingers throughout this exercise  Thumb Stabilization Strengthening Isometric 1st Dorsal Interosseous  EMR Notes  HEP - Sets 2  Reps 5 - hold for 10-15 seconds per repetition  Sessions per day every other day  Notes You may still hold the tennis ball and curve your finger, similarly to the exercise above this one  Thumb Stabilization Strengthening Isometric C  EMR Notes  HEP - Sets 2  Reps 5  Sessions per day every other day  Notes use a soft ball/tennis ball to position your thumb in a \"C\" curve, removing the ball while maintaining the curve. Hold for as long as tolerated, " "resting when you feel pain or when your thumb shifts out of the proper \"C\" position. Start off by holding for 5-10 seconds, then work up to 30 seconds as tolerated  Thumb Stabilization Assessing Stability with Pinch  EMR Notes  HEP - Sets  Reps  Sessions per day  Notes -Watch for thumb rotation*  Thumb Stabilization Place and Hold Pinch Muscle Re-Education  EMR Notes  HEP - Sets 2  Reps 5  Sessions per day every other day  Notes Hold for 15 seconds at a time  Thumb Joint Protection  EMR Notes  HEP - Sets  Reps  Sessions per day  Notes  Wrist Active Range of Motion Radial and Ulnar Deviation for deQuervains  EMR Notes  HEP - Sets 1  Reps 10  Sessions per day 2  Notes begin with thumb \"parked\" up against your hand, only painful  Wrist Passive Range of Motion DeQuervain's Stretch  EMR Notes  HEP - Sets 1  Reps 5  Sessions per day 2  Notes Gentle/pain free, hold for 10 seconds. You should feel a gentle stretch or pulling sensation, but no pain during or after exercise.    "

## 2022-11-20 ENCOUNTER — HEALTH MAINTENANCE LETTER (OUTPATIENT)
Age: 37
End: 2022-11-20

## 2023-04-15 ENCOUNTER — HEALTH MAINTENANCE LETTER (OUTPATIENT)
Age: 38
End: 2023-04-15

## 2024-06-16 ENCOUNTER — HEALTH MAINTENANCE LETTER (OUTPATIENT)
Age: 39
End: 2024-06-16

## 2025-06-21 ENCOUNTER — HEALTH MAINTENANCE LETTER (OUTPATIENT)
Age: 40
End: 2025-06-21